# Patient Record
Sex: FEMALE | Race: WHITE
[De-identification: names, ages, dates, MRNs, and addresses within clinical notes are randomized per-mention and may not be internally consistent; named-entity substitution may affect disease eponyms.]

---

## 2018-06-13 ENCOUNTER — HOSPITAL ENCOUNTER (EMERGENCY)
Dept: HOSPITAL 68 - ERH | Age: 38
End: 2018-06-13
Payer: COMMERCIAL

## 2018-06-13 VITALS — WEIGHT: 160 LBS | BODY MASS INDEX: 27.31 KG/M2 | HEIGHT: 64 IN

## 2018-06-13 VITALS — DIASTOLIC BLOOD PRESSURE: 55 MMHG | SYSTOLIC BLOOD PRESSURE: 106 MMHG

## 2018-06-13 DIAGNOSIS — N83.202: Primary | ICD-10-CM

## 2018-06-13 DIAGNOSIS — R10.32: ICD-10-CM

## 2018-06-13 LAB
ABSOLUTE GRANULOCYTE CT: 4.9 /CUMM (ref 1.4–6.5)
BASOPHILS # BLD: 0 /CUMM (ref 0–0.2)
BASOPHILS NFR BLD: 0.4 % (ref 0–2)
EOSINOPHIL # BLD: 0.3 /CUMM (ref 0–0.7)
EOSINOPHIL NFR BLD: 3.7 % (ref 0–5)
ERYTHROCYTE [DISTWIDTH] IN BLOOD BY AUTOMATED COUNT: 13.1 % (ref 11.5–14.5)
GRANULOCYTES NFR BLD: 60.7 % (ref 42.2–75.2)
HCT VFR BLD CALC: 36.6 % (ref 37–47)
LYMPHOCYTES # BLD: 2.3 /CUMM (ref 1.2–3.4)
MCH RBC QN AUTO: 29.1 PG (ref 27–31)
MCHC RBC AUTO-ENTMCNC: 33.8 G/DL (ref 33–37)
MCV RBC AUTO: 86.2 FL (ref 81–99)
MONOCYTES # BLD: 0.5 /CUMM (ref 0.1–0.6)
PLATELET # BLD: 263 /CUMM (ref 130–400)
PMV BLD AUTO: 9.8 FL (ref 7.4–10.4)
RED BLOOD CELL CT: 4.25 /CUMM (ref 4.2–5.4)
WBC # BLD AUTO: 8.1 /CUMM (ref 4.8–10.8)

## 2018-06-13 NOTE — ED GI/GU/ABDOMINAL COMPLAINT
History of Present Illness
 
General
Chief Complaint: Abdominal Pain/Flank Pain
Stated Complaint: LFT LOWER ABD PAIN
Source: patient
Exam Limitations: no limitations
Allergies
Coded Allergies:
No Known Allergies (16)
 
Reconcile Medications
Ibuprofen 800 MG TABLET   1 TAB PO TID PAIN
Oxycodone HCl/Acetaminophen (Percocet 5-325 MG Tablet) 5 MG-325 MG TABLET   1-2 
TAB PO BID PAIN
 
Triage Note:
37F WITH LLQ PAIN X1 WEEK, WORST TODAY. LAST
 MENSES . HYPOTENSIVE IN TRIAGE. PAIN IS
 CRAMPING IN NATURE. -N/V/D. -CP/SOB/NOLASCO/COUGH
 CRAMPING IN NATURE. -N/V/D. -CP/SOB/NOLASCO/COUGH.
 AFEBRILE
Triage Nurses Notes Reviewed? yes
Pregnant? N
Is pt currently breastfeeding? No
Onset: Abrupt
Duration: hour(s):
Quality/Severity: aching, moderate, sharpness
Location: left lower quadrant
Radiation: no radiation
Activities at Onset: none
No Modifying Factors: none
HPI:
37-year-old female comes into the emergency room for complaints of left lower 
abdominal pain.  Patient admits that she's had some intermittent pain for years 
to the left lower side of her abdomen.  She reports he got worse recently over 
the past few days.  Comes and goes.  Sharp and aching.  Denies any blood in her 
stool.  Denies any change in bowel movement.  Denies any fever chills vomiting. 
Denies any urinary symptoms or vaginal discharge.  Denies any abdominal 
surgeries.  She comes in for further evaluation.  She saw her OB/GYN doctor who 
did not feel that it was ovarian related.  She is due to get an ultrasound as an
outpatient tomorrow.
(Cristino Helton)
 
Vital Signs & Intake/Output
Vital Signs & Intake/Output
 Vital Signs
 
 
Date Time Temp Pulse Resp B/P B/P Pulse O2 O2 Flow FiO2
 
     Mean Ox Delivery Rate 
 
 1049 98.8 72 18 106/55  100 Room Air  
 
 0751 98.4 86 18 93/60  99 Room Air  
 
 
 
(Dick Dickens)
 
Past History
 
Travel History
Traveled to [place] past 21 day No
 
Medical History
Any Pertinent Medical History? none
Neurological: NONE
EENT: NONE
Cardiovascular: NONE
Respiratory: NONE
Gastrointestinal: NONE
Hepatic: NONE
Renal: NONE
Musculoskeletal: NONE
Psychiatric: NONE
Endocrine: NONE
Blood Disorders: NONE
Cancer(s): NONE
GYN/Reproductive: NONE
 
Surgical History
Surgical History: nO ABDOMINAL SURGERY
 
Psychosocial History
What is your primary language English
Tobacco Use: Never used
ETOH Use: denies use
Illicit Drug Use: denies illicit drug use
 
Family History
Hx Contributory? No
(Cristino Helton)
 
Review of Systems
 
Review of Systems
Constitutional:
Reports: no symptoms. 
EENTM:
Reports: no symptoms. 
Respiratory:
Reports: no symptoms. 
Cardiovascular:
Reports: no symptoms. 
GI:
Reports: see HPI. 
Genitourinary:
Reports: no symptoms. 
Musculoskeletal:
Reports: no symptoms. 
Skin:
Reports: no symptoms. 
Neurological/Psychological:
Reports: no symptoms. 
Hematologic/Endocrine:
Reports: no symptoms. 
Immunologic/Allergic:
Reports: no symptoms. 
All Other Systems: Reviewed and Negative
(Cristino Helton)
 
Physical Exam
 
Physical Exam
General Appearance: well developed/nourished, no apparent distress, alert, awake
Head: atraumatic, normal appearance
Eyes:
Bilateral: normal appearance, EOMI. 
Ears, Nose, Throat, Mouth: hearing grossly normal, moist mucous membrane
Neck: normal inspection, full range of motion
Respiratory: normal breath sounds, no respiratory distress
Gastrointestinal: soft, tenderness (LEFT LOWER QUADRANT)
Back: normal inspection
Extremities: normal range of motion
Neurologic/Psych: awake, alert, oriented x 3, normal gait
Skin: intact, normal color
 
Core Measures
ACS in differential dx? No
Sepsis Present: No
Sepsis Focused Exam Completed? No
(Cristino Helton)
 
Progress
Differential Diagnosis: appendicitis, bowel obstruction, diverticulitis, ectopic
pregnancy, hernia, ischemic bowel, kidney stone, ovarian cyst, ovarian torsion, 
PUD/GERD, SBO, UTI/pyelo
Diagnostic Imaging:
Viewed by Me: CT Scan, Ultrasound.  Discussed w/RAD: CT Scan, Ultrasound. 
Radiology Impression: PATIENT: PROSPER GANNON  MEDICAL RECORD NO: [id number] 
PRESENT AGE: 37  PATIENT ACCOUNT NO: [id number] : 82  LOCATION: [facility] 
ORDERING PHYSICIAN: Cristino SOLIS     SERVICE DATE:  EXAM TYPE
: CAT - CT ABD & PELVIS W IV CONTRAST EXAMINATION: CT ABDOMEN AND PELVIS WITH 
CONTRAST CLINICAL INFORMATION: 37-year-old female with left lower quadrant pain 
for one week. COMPARISON: None TECHNIQUE: Multidetector volumetric imaging was 
performed of the abdomen and pelvis following IV administration of 94 mL of 
Optiray 320 intravenous contrast. Sagittal and coronal reformatted images were 
obtained on the technologist's workstation. DLP: 326 mGy-cm FINDINGS: LUNG BASES
: No acute findings. Multiple small noncalcified pleural-based nodules of < 0.5 
cm size in the examined lung bases. The 0.5 cm nodule in the inferior lingula 
with somewhat triangular shaped could represent a pulmonary lymph node (image 
115, series 4).  Findings include a 0.3 cm pulmonary nodule in left lung base (
image 154, series 4). No pericardial or pleural effusion. LIVER, GALLBLADDER, 
AND BILIARY TREE: Unremarkable. PANCREAS: Unremarkable. SPLEEN: Unremarkable. 
ADRENAL GLANDS: Unremarkable. KIDNEYS AND URETERS: Kidneys are normal in size 
and enhance symmetrically. No renal mass, nephrolithiasis, hydronephrosis or 
perinephric edema. BLADDER: Unremarkable. GASTROINTESTINAL TRACT: Loops of bowel
are normal in caliber. Stomach is normal. Appendix is normal. No evidence of 
inflammation or obstruction along the gastrointestinal tract. No mesenteric fat 
stranding, ascites or pneumoperitoneum. ABDOMINAL WALL: Unremarkable. LYMPH 
NODES: Normal. VASCULAR: Abdominal aorta is normal in caliber. The celiac trunk,
SMA, JESSIE and renal arteries are widely patent. PELVIC VISCERA: The anteflexed 
uterus is normal. The dominant follicle of the right ovary measures up to 2.6 
cm. Normal sized follicles are seen within the left ovary. OSSEOUS STRUCTURES: 
Unremarkable. IMPRESSION: 1. No acute findings in the abdomen or pelvis. No 
specific source of left lower quadrant pain is identified. 2. No evidence of 
urolithiasis or urinary tract obstruction. 3. Incidentally detected are small 
pulmonary and pleural-based nodules of the lung bases, and some of these could 
represent lymph nodes. Chest CT follow up is likely not warranted, especially if
patient is without risk factors. DICTATED BY: Ricci Gates MD  DATE/TIME 
DICTATED:18 :RAD.WHITMAN  DATE/TIME TRANSCRIBED:950 CONFIDENTIAL, DO NOT COPY WITHOUT APPROPRIATE AUTHORIZATION.  <
Electronically signed in Other Vendor System>                                   
                                                    SIGNED BY: Ricci Gates MD
18 1003, PATIENT: PROSPER GANNON  MEDICAL RECORD NO: [id number] PRESENT AGE
: 37  PATIENT ACCOUNT NO: [id number] : 82  LOCATION: [facility] ORDERING 
PHYSICIAN: Dick SOLIS     SERVICE DATE: 5 EXAM TYPE: US - US-
TRANSVAGINAL EXAMINATION: ULTRASOUND OF THE PELVIS CLINICAL INFORMATION: Left-
sided pelvic pain. Presumptive diagnosis of ovarian torsion. COMPARISON: CT scan
of the abdomen and pelvis dated 2018. TECHNIQUE: Transabdominal and 
transvaginal pelvic ultrasound. A transvaginal study was performed in addition 
to the transabdominal study which did not yield an adequate examination of the 
uterus and ovaries due to superimposed distended gas-filled loops of bowel. 
FINDINGS: Uterus: The uterus is anteverted and normal in size, measuring 8.8 x 
5.5 x 5.9 cm. The endometrial stripe thickness is normal, measuring 1.0 cm in 
thickness. There is a left-sided posterior uterine body intramural fibroid, 
measuring 2.0 x 1.9 x 2.0 cm in size. The cervical length is normal measuring 
4.1 cm. Small nabothian cyst is seen in the cervix. Ovaries: The ovaries 
bilaterally are visualized, with the right ovary measuring 4.1 x 2.9 x 3.2 cm (
19.7 mL) and the left ovary measuring 3.9 x 2.1 x 2.9 cm (12.6 mL). There is a 
2.8 x 2.1 x 2.7 cm complicated cyst with low-level internal echoes and internal 
incomplete septations in the right ovary, most consistent with a physiologic 
finding. There is a 2.3 x 2.0 x 2.6 cm complex cystic mass with thickened 
echogenic walls, most likely representing an involuting corpus luteum cyst. With
color Doppler imaging, normal arterial and venous flow to both ovaries is seen. 
Other: No adnexal mass or free fluid collection seen. IMPRESSION: 1. Small 
posterior left-sided uterine body intramural fibroid. Uterus and endometrial 
stripe otherwise unremarkable. 2. No evidence of ovarian torsion. Normal 
arterial and venous flow to both ovaries is seen. 3. Complicated and septated 
benign right ovarian cyst. 4. Left ovarian complex cystic mass with thickened 
echogenic walls is seen. Finding is most consistent with a benign physiologic 
process such as a involuting corpus luteum cyst. Given the patient's left lower 
quadrant symptomatology, follow-up imaging in 6 weeks is recommended for 
reassessment. DICTATED BY: Cintia Mckeon MD  DATE/TIME DICTATED:18 :RAD.WHITMAN  DATE/TIME TRANSCRIBED:18 
CONFIDENTIAL, DO NOT COPY WITHOUT APPROPRIATE AUTHORIZATION.  <Electronically 
signed in Other Vendor System>                                                  
                                     SIGNED BY: Cintia Mckeon MD 18 1251
Initial ED EKG: none
(Cristino Helton)
Plan of Care:
 Orders
 
 
Procedure Date/time Status
 
LIPASE 813 Complete
 
LACTIC ACID 813 Complete
 
COMPREHENSIVE METABOLIC PANEL 813 Complete
 
CBC WITHOUT DIFFERENTIAL 813 Complete
 
URINE PREGNANCY  075 Complete
 
URINALYSIS 752 Complete
 
 
 Laboratory Tests
 
 
 
18 1113:
Lactic Acid Cancelled
 
18 0830:
Anion Gap 12, Estimated GFR > 60, BUN/Creatinine Ratio 22.9, Glucose 96, Lactic 
Acid 0.8, Calcium 9.4, Total Bilirubin 0.3, AST 26, ALT 21, Alkaline Phosphatase
50, Total Protein 7.4, Albumin 4.0, Globulin 3.4, Albumin/Globulin Ratio 1.2, 
Lipase 67, CBC w Diff NO MAN DIFF REQ, RBC 4.25, MCV 86.2, MCH 29.1, MCHC 33.8, 
RDW 13.1, MPV 9.8, Gran % 60.7, Lymphocytes % 28.9, Monocytes % 6.3, Eosinophils
% 3.7, Basophils % 0.4, Absolute Granulocytes 4.9, Absolute Lymphocytes 2.3, 
Absolute Monocytes 0.5, Absolute Eosinophils 0.3, Absolute Basophils 0
 
18 0800:
Urine Color YEL, Urine Clarity HAZY  H, Urine pH 6.0, Ur Specific Gravity >= 
1.030, Urine Protein NEG, Urine Ketones NEG, Urine Nitrite NEG, Urine Bilirubin 
NEG, Urine Urobilinogen 0.2, Ur Leukocyte Esterase TRACE  H, Ur Microscopic 
SEDIMENT EXAMINED, Urine RBC 3-5, Urine WBC 1-3  H, Ur Epithelial Cells FEW, 
Urine Bacteria MOD  H, Urine Hemoglobin TRACE-LYSED, Urine Glucose NEG, Urine 
Pregnancy Test NEGATIVE
 
 
CT scan does not show any acute findings.  Patient is requesting she get her 
pelvic ultrasound today.  Transvaginal ultrasound ordered.  Patient was also 
medicated with IV Zofran.  She states her pain is improved after morphine.
(Dick Dickens)
 
Departure
 
Departure
Disposition: HOME OR SELF CARE
Condition: Stable
Clinical Impression
Primary Impression: Left ovarian cyst
Secondary Impressions: Abdominal pain
Referrals:
Patient Has No Primary Care Dr (PCP/Family)
 
Additional Instructions:
Follow-up with your OB/GYN doctor for repeat ultrasound in 6 weeks.  Return if 
any concerns worsening symptoms.  Take Percocet and ibuprofen for pain.
 
Please go over all results of today's visit with your primary care doctor.  
Contact your primary care doctor to let them know you were here in the emergency
room.  There may be nonspecific findings which may not be related to your visit 
today here in the emergency room but may require further evaluation and chronic 
monitoring by your primary care doctor.  If you had a laceration today the 
chance of foreign body always remains. You should follow-up with your primary 
care doctor for recheck in 3-5 days for a wound check.  If you had an x-ray done
there is a chance that a fracture could have been missed on initial read and you
should follow-up with your primary care doctor for repeat x-rays if symptoms 
persist.  If your blood pressure was elevated here in the emergency room please 
have rechecked by [facility] primary care doctor within the next 48.  If you were 
prescribed a narcotic here in the emergency room or any type of controlled 
substances you're not allowed to drive while taking this medication or operate 
any type of heavy machinery.  Narcotics can make you feel lightheaded dizziness 
nausea and can cause constipation.  You may need to  a stool softener.  
Thank you for choosing [facility] emergency room.  Please return to the 
emergency room immediately if you have any other concerns worsening of symptoms.
 
Departure Forms:
Customer Survey
General Discharge Information
Prescriptions:
Current Visit Scripts
Oxycodone HCl/Acetaminophen (Percocet 5-325 MG Tablet) 1-2 TAB PO BID  
     #10 TAB 
 
Ibuprofen 1 TAB PO TID  
     #30 TAB 
 
 
Comments
2018 3:55:01 PM
 
Return if any concerns worsening symptoms.  Follow-up with your primary care 
doctor.  Return if any other concerns worsening symptoms.  Patient's symptoms 
significantly improved.  Ovarian cyst.  Follow-up with gynecologist.  
Understands and agrees with plan of care.
(Cristino Helton)
 
PA/NP Co-Sign Statement
Statement:
ED Attending supervision documentation-
 
[] I saw and evaluated the patient. I have also reviewed all the pertinent lab 
results and diagnostic results. I agree with the findings and the plan of care 
as documented in the PA's/NP's documentation. 
 
[X] I have reviewed the ED Record and agree with the PA's/NP's documentation.
 
[] Additions or exceptions (if any) to the PAs/NP's note and plan are 
summarized below:
[]
 
(Bud Garcia DO)

## 2018-06-13 NOTE — ULTRASOUND REPORT
EXAMINATION:
ULTRASOUND OF THE PELVIS
 
CLINICAL INFORMATION:
Left-sided pelvic pain. Presumptive diagnosis of ovarian torsion.
 
COMPARISON:
CT scan of the abdomen and pelvis dated 06/13/2018.
 
TECHNIQUE:
Transabdominal and transvaginal pelvic ultrasound.
 
A transvaginal study was performed in addition to the transabdominal study
which did not yield an adequate examination of the uterus and ovaries due to
superimposed distended gas-filled loops of bowel.
 
FINDINGS:
Uterus: The uterus is anteverted and normal in size, measuring 8.8 x 5.5 x
5.9 cm. The endometrial stripe thickness is normal, measuring 1.0 cm in
thickness. There is a left-sided posterior uterine body intramural fibroid,
measuring 2.0 x 1.9 x 2.0 cm in size.
 
The cervical length is normal measuring 4.1 cm. Small nabothian cyst is seen
in the cervix.
 
Ovaries: The ovaries bilaterally are visualized, with the right ovary
measuring 4.1 x 2.9 x 3.2 cm (19.7 mL) and the left ovary measuring 3.9 x 2.1
x 2.9 cm (12.6 mL). There is a 2.8 x 2.1 x 2.7 cm complicated cyst with
low-level internal echoes and internal incomplete septations in the right
ovary, most consistent with a physiologic finding. There is a 2.3 x 2.0 x 2.6
cm complex cystic mass with thickened echogenic walls, most likely
representing an involuting corpus luteum cyst. With color Doppler imaging,
normal arterial and venous flow to both ovaries is seen.
 
Other: No adnexal mass or free fluid collection seen.
 
IMPRESSION:
 
1. Small posterior left-sided uterine body intramural fibroid. Uterus and
endometrial stripe otherwise unremarkable.
2. No evidence of ovarian torsion. Normal arterial and venous flow to both
ovaries is seen.
3. Complicated and septated benign right ovarian cyst.
4. Left ovarian complex cystic mass with thickened echogenic walls is seen.
Finding is most consistent with a benign physiologic process such as a
involuting corpus luteum cyst. Given the patient's left lower quadrant
symptomatology, follow-up imaging in 6 weeks is recommended for reassessment.